# Patient Record
Sex: FEMALE | Race: WHITE
[De-identification: names, ages, dates, MRNs, and addresses within clinical notes are randomized per-mention and may not be internally consistent; named-entity substitution may affect disease eponyms.]

---

## 2017-09-12 ENCOUNTER — HOSPITAL ENCOUNTER (OUTPATIENT)
Dept: HOSPITAL 74 - JASU-ENDO | Age: 42
Discharge: HOME | End: 2017-09-12
Attending: INTERNAL MEDICINE
Payer: COMMERCIAL

## 2017-09-12 VITALS — DIASTOLIC BLOOD PRESSURE: 56 MMHG | HEART RATE: 75 BPM | SYSTOLIC BLOOD PRESSURE: 110 MMHG

## 2017-09-12 VITALS — BODY MASS INDEX: 34.9 KG/M2

## 2017-09-12 VITALS — TEMPERATURE: 97.2 F

## 2017-09-12 DIAGNOSIS — K52.9: ICD-10-CM

## 2017-09-12 DIAGNOSIS — K63.3: ICD-10-CM

## 2017-09-12 DIAGNOSIS — D64.9: Primary | ICD-10-CM

## 2017-09-12 DIAGNOSIS — K64.8: ICD-10-CM

## 2017-09-12 PROCEDURE — 0DJD8ZZ INSPECTION OF LOWER INTESTINAL TRACT, VIA NATURAL OR ARTIFICIAL OPENING ENDOSCOPIC: ICD-10-PCS | Performed by: INTERNAL MEDICINE

## 2017-09-13 NOTE — PATH
Surgical Pathology Report



Patient Name:  SHAHRIAR WORLEY

Accession #:  [id number]

[facility]. Rec. #:  [id number]                                                        

   /Age/Gender:  1975 (Age: 42) / F

Account:  [id number]                                                          

             Location: ASU-ENDOSCOPY

Taken:  2017

Received:  2017

Reported:  2017

Physicians:  Shayne Peterson M.D.

  



Specimen(s) Received

 BX TERMINAL ILEUM 





Clinical History

Family history of colon cancer, anemia

Ulcer terminal ileum, rule out Crohn's disease







Final Diagnosis

TERMINAL ILEUM, ULCER, BIOPSY:

ILEAL MUCOSA SHOWING MODERATE CHRONIC ACTIVE ILEITIS WITH PROMINENT REACTIVE

LYMPHOID AGGREGATES.

NEGATIVE FOR DYSPLASIA.





***Electronically Signed***

Radha Cabrera M.D.





Gross Description

Received in formalin, labeled "biopsy ulcer terminal ileum" is a tan, irregular

portion of soft tissue measuring 0.4 cm. in greatest dimension. The specimen is

submitted in toto in one cassette.





saudi

## 2019-07-12 ENCOUNTER — HOSPITAL ENCOUNTER (INPATIENT)
Dept: HOSPITAL 74 - JER | Age: 44
LOS: 4 days | Discharge: HOME | DRG: 469 | End: 2019-07-16
Attending: INTERNAL MEDICINE | Admitting: INTERNAL MEDICINE
Payer: COMMERCIAL

## 2019-07-12 VITALS — BODY MASS INDEX: 33.5 KG/M2

## 2019-07-12 DIAGNOSIS — Q61.3: ICD-10-CM

## 2019-07-12 DIAGNOSIS — N17.9: Primary | ICD-10-CM

## 2019-07-12 DIAGNOSIS — F17.210: ICD-10-CM

## 2019-07-12 DIAGNOSIS — E78.5: ICD-10-CM

## 2019-07-12 DIAGNOSIS — N18.9: ICD-10-CM

## 2019-07-12 DIAGNOSIS — I13.10: ICD-10-CM

## 2019-07-12 LAB
ALBUMIN SERPL-MCNC: 3.4 G/DL (ref 3.4–5)
ALP SERPL-CCNC: 54 U/L (ref 45–117)
ALT SERPL-CCNC: 23 U/L (ref 13–61)
ANION GAP SERPL CALC-SCNC: 7 MMOL/L (ref 8–16)
APPEARANCE UR: CLEAR
AST SERPL-CCNC: 15 U/L (ref 15–37)
BACTERIA # UR AUTO: 327.9 /HPF
BASOPHILS # BLD: 0.9 % (ref 0–2)
BILIRUB SERPL-MCNC: 0.2 MG/DL (ref 0.2–1)
BILIRUB UR STRIP.AUTO-MCNC: NEGATIVE MG/DL
BUN SERPL-MCNC: 57.6 MG/DL (ref 7–18)
CALCIUM SERPL-MCNC: 8 MG/DL (ref 8.5–10.1)
CASTS URNS QL MICRO: 1 /LPF (ref 0–8)
CHLORIDE SERPL-SCNC: 115 MMOL/L (ref 98–107)
CO2 SERPL-SCNC: 21 MMOL/L (ref 21–32)
COLOR UR: YELLOW
CREAT SERPL-MCNC: 3.9 MG/DL (ref 0.55–1.3)
DEPRECATED RDW RBC AUTO: 13.4 % (ref 11.6–15.6)
EOSINOPHIL # BLD: 0.8 % (ref 0–4.5)
EPITH CASTS URNS QL MICRO: 1.6 /HPF
GLUCOSE SERPL-MCNC: 85 MG/DL (ref 74–106)
HCT VFR BLD CALC: 29.6 % (ref 32.4–45.2)
HGB BLD-MCNC: 10 GM/DL (ref 10.7–15.3)
KETONES UR QL STRIP: NEGATIVE
LEUKOCYTE ESTERASE UR QL STRIP.AUTO: NEGATIVE
LYMPHOCYTES # BLD: 18.5 % (ref 8–40)
MCH RBC QN AUTO: 27.4 PG (ref 25.7–33.7)
MCHC RBC AUTO-ENTMCNC: 33.7 G/DL (ref 32–36)
MCV RBC: 81.3 FL (ref 80–96)
MONOCYTES # BLD AUTO: 5.9 % (ref 3.8–10.2)
NEUTROPHILS # BLD: 73.9 % (ref 42.8–82.8)
NITRITE UR QL STRIP: NEGATIVE
PH UR: 6 [PH] (ref 5–8)
PLATELET # BLD AUTO: 296 K/MM3 (ref 134–434)
PMV BLD: 8.1 FL (ref 7.5–11.1)
POTASSIUM SERPLBLD-SCNC: 4.3 MMOL/L (ref 3.5–5.1)
PROT SERPL-MCNC: 6.9 G/DL (ref 6.4–8.2)
PROT UR QL STRIP: (no result)
PROT UR QL STRIP: NEGATIVE
RBC # BLD AUTO: 3 /HPF (ref 0–4)
RBC # BLD AUTO: 3.64 M/MM3 (ref 3.6–5.2)
SODIUM SERPL-SCNC: 143 MMOL/L (ref 136–145)
SP GR UR: 1.01 (ref 1.01–1.03)
UROBILINOGEN UR STRIP-MCNC: 0.2 MG/DL (ref 0.2–1)
WBC # BLD AUTO: 8.2 K/MM3 (ref 4–10)
WBC # UR AUTO: 3 /HPF (ref 0–5)
YEAST BUDDING URNS QL: (no result)

## 2019-07-12 NOTE — HP
Admitting History and Physical





- Primary Care Physician


PCP: Percy Teresa





- Admission


Chief Complaint: elevated creatinine


History of Present Illness: 





Patient is a 44 year old female with a significant past medical history of 

hypertension, hyperlipidemia, CKD, polycystic kidney disease and daily smoker (2

-5 cigarettes per day).  Patient has been following Dr. Meadows for 

approximately 1 year for worsening for CKD.  Patient sent to [facility] ED from Dr. Teresa office for elevated creatinine on office labs.  Her baseline creatinine is 

reported to be 1.9 and noted to be 3.4 on office labs.  On presentation to the 

ED, creatinine noted to be 3.9.


She denies shortness of breath, denies chest pain, no fatigue, she reports 

making urine, denies any hematuria.  She was recently treated for an URI with 

azithromycin but denies any any NSAIDS.  





Nephrology has been consulted.  Will start patient on gentle hydration pending 

neprhology evaluation.











History Source: Patient


Limitations to Obtaining History: No Limitations





- Past Medical History


Cardiovascular: Yes: HTN





- Smoking History


Smoking history: Current every day smoker


Have you smoked in the past 12 months: Yes


Aproximately how many cigarettes per day: 5





- Alcohol/Substance Use


Hx Alcohol Use: No





Home Medications





- Allergies


Allergies/Adverse Reactions: 


 Allergies











Allergy/AdvReac Type Severity Reaction Status Date / Time


 


No Known Allergies Allergy   Verified 07/12/19 10:47














- Home Medications


Home Medications: 


Ambulatory Orders





Atorvastatin Ca [Lipitor] 10 mg PO DAILY 09/11/17 


Ergocalciferol (Vitamin D2) [Vitamin D2] 50,000 unit PO WEEKLY 09/11/17 


Diltiazem HCl [Diltiazem 24Hr Cd] 240 mg PO DAILY 07/12/19 


Fenofibrate Nanocrystallized [Fenofibrate] 145 mg PO DAILY 07/12/19 


Loratadine [Claritin -] 10 mg PO DAILY PRN 07/12/19 


[place]-3/Dha/Epa/Fish Oil [Fish Oil 1,000 mg Softgel] 2 each PO DAILY 07/12/19 











Review of Systems





- Review of Systems


Constitutional: reports: No Symptoms


Eyes: reports: No Symptoms


HENT: reports: No Symptoms


Neck: reports: No Symptoms


Cardiovascular: reports: No Symptoms


Respiratory: reports: No Symptoms


Gastrointestinal: reports: No Symptoms


Genitourinary: reports: No Symptoms





Physical Examination


Vital Signs: 


 Vital Signs











Temperature  98.6 F   07/12/19 10:44


 


Pulse Rate  92 H  07/12/19 10:44


 


Respiratory Rate  16   07/12/19 10:44


 


Blood Pressure  156/78   07/12/19 10:44


 


O2 Sat by Pulse Oximetry (%)  98   07/12/19 10:44











Constitutional: Yes: Well Nourished, No Distress, Calm


Eyes: Yes: WNL


HENT: Yes: WNL


Neck: Yes: WNL


Cardiovascular: Yes: Regular Rate and Rhythm


Respiratory: Yes: WNL


Gastrointestinal: Yes: WNL


...Rectal Exam: Yes: Deferred


Edema: No


Labs: 


 CBC, BMP





 07/12/19 11:38 





 07/12/19 11:25 











Problem List





- Problems


(1) Renal failure


Assessment/Plan: 


Elevated BUN and creatinine of 3.9 on admission


will start on gentle hydration of NS @ 50cc


Nephrology consulted


further workup per nephrology


Repeat labs in a.m. 


Renal diet


renal dose medications.


Code(s): N19 - UNSPECIFIED KIDNEY FAILURE   





(2) Hypertension


Assessment/Plan: 


BP controlled on Cardizem 240mg daily


Monitor BP q 4 


Code(s): I10 - ESSENTIAL (PRIMARY) HYPERTENSION   





(3) Hyperlipemia


Assessment/Plan: 


On Lipitor and fenofibrate





Code(s): E78.5 - HYPERLIPIDEMIA, UNSPECIFIED   





(4) Prophylactic measure


Assessment/Plan: 


fen


NS @ 50cc/hr


monitor electrolytes


renal diet





full code





Code(s): Z29.9 - ENCOUNTER FOR PROPHYLACTIC MEASURES, UNSPECIFIED   





Visit type





- Emergency Visit


Emergency Visit: Yes


ED Registration Date: 07/12/19


Care time: The patient presented to the Emergency Department on the above date 

and was hospitalized for further evaluation of their emergent condition.





- New Patient


This patient is new to me today: Yes


Date on this admission: 07/12/19





- Critical Care


Critical Care patient: No

## 2019-07-12 NOTE — PDOC
History of Present Illness





- General


Chief Complaint: Revisit, Lab Variance


Stated Complaint: SENT BY PCP


Time Seen by Provider: 07/12/19 11:11


History Source: Patient


Exam Limitations: No Limitations





- History of Present Illness


Initial Comments: 





07/12/19 11:17


45 yo F h/o HTN HLD, CKD from polycystic kidney disease, ( dr strickland 

nephrologist) here today from clinic Dr Teresa, told to come to ED for elevated 

creatinine. baseline creatinine is 1.9, on 7/9 was noted to be 3.45. denies sob

, no chest pain. mild fatigue, no nausea. good appetitie. no change to urine 

output. no hematuria., no dysuria, no frequency. 


recently on azithromycin completed 5 day course 3 days ago for upper 

respiratory infection ( sore throat). 


07/12/19 11:19








Past History





- Past Medical History


Allergies/Adverse Reactions: 


 Allergies











Allergy/AdvReac Type Severity Reaction Status Date / Time


 


No Known Allergies Allergy   Verified 07/12/19 10:47











Home Medications: 


Ambulatory Orders





Atorvastatin Ca [Lipitor] 10 mg PO DAILY 09/11/17 


Ergocalciferol (Vitamin D2) [Vitamin D2] 50,000 unit PO WEEKLY 09/11/17 


Diltiazem HCl [Diltiazem 24Hr Cd] 240 mg PO DAILY 07/12/19 


Fenofibrate Nanocrystallized [Fenofibrate] 145 mg PO DAILY 07/12/19 


Loratadine [Claritin -] 10 mg PO DAILY PRN 07/12/19 


[place]-3/Dha/Epa/Fish Oil [Fish Oil 1,000 mg Softgel] 2 each PO DAILY 07/12/19 








Anemia: Yes


Asthma: No


Cancer: No


Cardiac Disorders: No


COPD: No


 Disorders: Yes (KIDNEY STONES)


HTN: Yes


Hypercholesterolemia: Yes


Kidney Stones: Yes





- Surgical History


Abdominal Surgery: No


Appendectomy: No


Cardiac Surgery: No


Orthopedic Surgery: No





- Family Disease History


Family Disease History: Diabetes: Mother, Heart Disease: Mother





- Immunization History


Immunization Up to Date: Yes





- Suicide/Smoking/Psychosocial Hx


Smoking Status: Yes


Smoking History: Current every day smoker


Have you smoked in the past 12 months: Yes


Number of Cigarettes Smoked Daily: 5


Information on smoking cessation initiated: No


'Breaking Loose' booklet given: 09/12/17


Hx Alcohol Use: No


Drug/Substance Use Hx: No


Substance Use Type: None


Hx Substance Use Treatment: No





**Review of Systems





- Review of Systems


Constitutional: No: Chills, Fever, Weakness


HEENTM: No: Eye Pain


Respiratory: No: Cough, Orthopnea, Shortness of Breath


Cardiac (ROS): No: Chest Pain, Edema, Irregular Heart Rate


: No: Burning, Dysuria, Discharge


Musculoskeletal: No: Back Pain


All Other Systems: Reviewed and Negative





*Physical Exam





- Vital Signs


 Last Vital Signs











Temp Pulse Resp BP Pulse Ox


 


 98.6 F   92 H  16   156/78   98 


 


 07/12/19 10:44  07/12/19 10:44  07/12/19 10:44  07/12/19 10:44  07/12/19 10:44














- Physical Exam


Comments: 





07/12/19 11:20


awake alert lungs clear bilaterally heart rrr no mrg abd soft nt nd ext wwp no 

cva tenderness. skin warm and dry. nuero alert oriented x 3. 





ED Treatment Course





- LABORATORY


CBC & Chemistry Diagram: 


 07/13/19 06:42





 07/13/19 06:42





Medical Decision Making





- Medical Decision Making





07/12/19 11:21


pt 45 yo F h/o HTN HLD, polycystic kidney diseaes, here with elevated 

creatinine. will repeat creatinine, d/w her nephrologist, and pcp dr teresa. ua 

labs ekg cxr ro pulm edema. 


07/12/19 14:02


pt creatinine elevated to 3.9 attempt to contact pt nephrologist dr Strickland, 

awaiting call back. message left for dr teresa, awaiting call back. will admit 

for renal failure. give gentle hydration. 500 NS. consult nephrology. d/w 

admitting team, admitted to dr belcher.


07/12/19 16:00


focused ED ultrasound renal


indication pckd, renal failure


bilat kidneys scanned in two planes.


both demonstrating several cyst c/w known polycystic kidney disease.


right kidney measures 17 cm in length, and left 15 cm.


no noted hydronephrosis however diffiuclty to visualize pelvis due to severity 

of cystic disease.


bladder nondistended.


impression: bilat polycystic kidneys.





pt admitted. will consult nephrology . d/w dr strickland, recommend partner 

[facility]. may require future dialysis.  d/w admitting team. will admit. 





*DC/Admit/Observation/Transfer


Diagnosis at time of Disposition: 


 Renal failure








- Discharge Dispostion


Decision to Admit order: Yes





- Referrals





- Patient Instructions





- Post Discharge Activity

## 2019-07-13 LAB
ANION GAP SERPL CALC-SCNC: 7 MMOL/L (ref 8–16)
BUN SERPL-MCNC: 50.1 MG/DL (ref 7–18)
CALCIUM SERPL-MCNC: 8.1 MG/DL (ref 8.5–10.1)
CHLORIDE SERPL-SCNC: 115 MMOL/L (ref 98–107)
CO2 SERPL-SCNC: 19 MMOL/L (ref 21–32)
CREAT SERPL-MCNC: 3.5 MG/DL (ref 0.55–1.3)
DEPRECATED RDW RBC AUTO: 13.8 % (ref 11.6–15.6)
GLUCOSE SERPL-MCNC: 88 MG/DL (ref 74–106)
HCT VFR BLD CALC: 28.5 % (ref 32.4–45.2)
HGB BLD-MCNC: 9.4 GM/DL (ref 10.7–15.3)
MAGNESIUM SERPL-MCNC: 1.9 MG/DL (ref 1.8–2.4)
MCH RBC QN AUTO: 27.4 PG (ref 25.7–33.7)
MCHC RBC AUTO-ENTMCNC: 33.1 G/DL (ref 32–36)
MCV RBC: 82.8 FL (ref 80–96)
PLATELET # BLD AUTO: 296 K/MM3 (ref 134–434)
PMV BLD: 8.7 FL (ref 7.5–11.1)
POTASSIUM SERPLBLD-SCNC: 4.1 MMOL/L (ref 3.5–5.1)
RBC # BLD AUTO: 3.45 M/MM3 (ref 3.6–5.2)
SODIUM SERPL-SCNC: 141 MMOL/L (ref 136–145)
WBC # BLD AUTO: 7.2 K/MM3 (ref 4–10)

## 2019-07-13 RX ADMIN — ATORVASTATIN CALCIUM SCH MG: 10 TABLET, FILM COATED ORAL at 21:54

## 2019-07-13 RX ADMIN — SODIUM CHLORIDE SCH MLS/HR: 9 INJECTION, SOLUTION INTRAVENOUS at 16:30

## 2019-07-13 RX ADMIN — FENOFIBRIC ACID SCH MG: 135 CAPSULE, DELAYED RELEASE PELLETS ORAL at 09:03

## 2019-07-13 NOTE — EKG
Test Reason : 

Blood Pressure : ***/*** mmHG

Vent. Rate : 092 BPM     Atrial Rate : 092 BPM

   P-R Int : 130 ms          QRS Dur : 084 ms

    QT Int : 368 ms       P-R-T Axes : 027 018 017 degrees

   QTc Int : 455 ms

 

SINUS RHYTHM WITH PREMATURE SUPRAVENTRICULAR COMPLEXES AND FUSION

COMPLEXES

OTHERWISE NORMAL ECG

WHEN COMPARED WITH ECG OF 01-DEC-2016 08:50,

FUSION COMPLEXES ARE NOW PRESENT

PREMATURE SUPRAVENTRICULAR COMPLEXES ARE NOW PRESENT

T WAVE AMPLITUDE HAS INCREASED IN ANTERIOR LEADS

Confirmed by QAMAR MUÑOZ, NAWAF ([id number]) on 7/13/2019 8:14:37 AM

 

Referred By:             Confirmed By:NAWAF FOOTE MD

## 2019-07-13 NOTE — PN
Progress Note, Physician


Chief Complaint: 





denies any malaise or discomfort





History of Present Illness: 





Patient is a 44 year old female with a significant past medical history of 

hypertension, hyperlipidemia, CKD, polycystic kidney disease and daily smoker (2

-5 cigarettes per day).  Patient has been following Dr. Meadows for 

approximately 1 year for worsening for CKD.  Patient sent to [facility] ED from Dr. Teresa office for elevated creatinine on office labs.  Her baseline creatinine is 

reported to be 1.9 and noted to be 3.4 on office labs.  On presentation to the 

ED, creatinine noted to be 3.9.


She denies shortness of breath, denies chest pain, no fatigue, she reports 

making urine, denies any hematuria.  She was recently treated for an URI with 

azithromycin but denies any any NSAIDS.  





Nephrology has been consulted.  Will start patient on gentle hydration pending 

neprhology evaluation.








- Current Medication List


Current Medications: 


Active Medications





Atorvastatin Calcium (Lipitor -)  10 mg PO [facility]


Diltiazem HCl (Cardizem Cd -)  240 mg PO DAILY [facility]


   Last Admin: 07/13/19 09:02 Dose:  240 mg


Fenofibric Acid (Trilipix -)  135 mg PO DAILY [facility]


   Last Admin: 07/13/19 09:03 Dose:  135 mg


Sodium Chloride (Normal Saline -)  1,000 mls @ 50 mls/hr IV ASDIR [facility]


   Stop: 07/13/19 16:24


   Last Admin: 07/12/19 17:26 Dose:  50 mls/hr











- Objective


Vital Signs: 


 Vital Signs











Temperature  98.4 F   07/13/19 10:00


 


Pulse Rate  72   07/13/19 10:00


 


Respiratory Rate  20   07/13/19 10:00


 


Blood Pressure  149/78   07/13/19 10:00


 


O2 Sat by Pulse Oximetry (%)  100   07/12/19 21:00











Constitutional: Yes: Well Nourished


Eyes: Yes: WNL


HENT: Yes: WNL


Neck: Yes: WNL


Cardiovascular: Yes: WNL


Respiratory: Yes: WNL


...Rectal Exam: Yes: WNL


Genitourinary: Yes: WNL


Labs: 


 CBC, BMP





 07/13/19 06:42 





 07/13/19 06:42 











Problem List





- Problems


(1) Renal failure


Assessment/Plan: 


Elevated BUN and creatinine of 3.9 on admission, improving to 3.5 with gentle 

hydration


will continue hydration of NS @ 50cc pending nephrology eval.


further workup per nephrology


Repeat labs in a.m. 


Renal diet


renal dose medications.


Code(s): N19 - UNSPECIFIED KIDNEY FAILURE   





(2) Hypertension


Assessment/Plan: 


BP controlled on Cardizem 240mg daily


Monitor BP q 4 


Code(s): I10 - ESSENTIAL (PRIMARY) HYPERTENSION   





(3) Hyperlipemia


Assessment/Plan: 


On Lipitor and fenofibrate





Code(s): E78.5 - HYPERLIPIDEMIA, UNSPECIFIED   





(4) Prophylactic measure


Assessment/Plan: 


fen


NS @ 50cc/hr


monitor electrolytes


renal diet





full code





Code(s): Z29.9 - ENCOUNTER FOR PROPHYLACTIC MEASURES, UNSPECIFIED   





Visit type





- Emergency Visit


Emergency Visit: Yes


ED Registration Date: 07/12/19


Care time: The patient presented to the Emergency Department on the above date 

and was hospitalized for further evaluation of their emergent condition.





- New Patient


This patient is new to me today: No





- Critical Care


Critical Care patient: No





- Discharge Referral


Referred to [facility] No

## 2019-07-13 NOTE — CON.NEP
Consult





- History of Present Illness


History of Present Illness: 











43 yo F h/o HTN HLD, CKD from polycystic kidney disease, ( dr alexandra 

nephrologist) here today from clinic Dr Teresa, told to come to ED for elevated 

creatinine. baseline creatinine is 1.9, on 7/9 was noted to be 3.45. denies sob

, no chest pain. mild fatigue, no nausea. good appetitie. no change to urine 

output. no hematuria., no dysuria, no frequency. 


recently on azithromycin completed 5 day course 3 days ago for upper 

respiratory infection ( sore throat). 


07/12/19 11:19




















- Past Medical History


Cardio/Vascular: Yes: HTN


...LMP: 06/24/19


...Pregnant: No





- Alcohol/Substance Use


Hx Alcohol Use: No





- Smoking History


Smoking history: Current every day smoker


Have you smoked in the past 12 months: Yes


Aproximately how many cigarettes per day: 5





Home Medications





- Allergies


Allergies/Adverse Reactions: 


 Allergies











Allergy/AdvReac Type Severity Reaction Status Date / Time


 


No Known Allergies Allergy   Verified 07/12/19 10:47














- Home Medications


Home Medications: 


Ambulatory Orders





Atorvastatin Ca [Lipitor] 10 mg PO DAILY 09/11/17 


Ergocalciferol (Vitamin D2) [Vitamin D2] 50,000 unit PO WEEKLY 09/11/17 


Diltiazem HCl [Diltiazem 24Hr Cd] 240 mg PO DAILY 07/12/19 


Fenofibrate Nanocrystallized [Fenofibrate] 145 mg PO DAILY 07/12/19 


Loratadine [Claritin -] 10 mg PO DAILY PRN 07/12/19 


[place]-3/Dha/Epa/Fish Oil [Fish Oil 1,000 mg Softgel] 2 each PO DAILY 07/12/19 











Nephrology Consult





- Height


Height: 5 ft





- Weight


Weight: 172 lb 0.2 oz





- BMI


Body Mass Index (BMI): 33.5





- Lab Results


CBC,BMP: 


 CBC, BMP





 07/13/19 06:42 





 07/13/19 06:42 








Anion Gap: 


 Anion Gap











Anion Gap  7 MMOL/L (8-16)  L  07/13/19  06:42    














- Physical Examination


Vital Signs: 


 Vital Signs











Temperature  98.2 F   07/13/19 14:18


 


Pulse Rate  86   07/13/19 14:18


 


Respiratory Rate  18   07/13/19 14:18


 


Blood Pressure  132/74   07/13/19 14:18


 


O2 Sat by Pulse Oximetry (%)  96   07/13/19 09:00














Assessment/Plan








KOBE on CKD


unclear wht the  kobe is from





h/o sinus problems rxd loratadine and an antibiotic


denies other malaise or symptom





seems to have early good response to IVF at 50 cc


I will increase IVF rate to 100 and f/u labs tomorrow





PKD baseline creat unclear





pt

## 2019-07-14 LAB
ALBUMIN SERPL-MCNC: 2.9 G/DL (ref 3.4–5)
ALP SERPL-CCNC: 41 U/L (ref 45–117)
ALT SERPL-CCNC: 17 U/L (ref 13–61)
ANION GAP SERPL CALC-SCNC: 8 MMOL/L (ref 8–16)
AST SERPL-CCNC: 14 U/L (ref 15–37)
BASOPHILS # BLD: 0.5 % (ref 0–2)
BILIRUB SERPL-MCNC: 0.3 MG/DL (ref 0.2–1)
BUN SERPL-MCNC: 51 MG/DL (ref 7–18)
CALCIUM SERPL-MCNC: 7.9 MG/DL (ref 8.5–10.1)
CHLORIDE SERPL-SCNC: 115 MMOL/L (ref 98–107)
CO2 SERPL-SCNC: 20 MMOL/L (ref 21–32)
CREAT SERPL-MCNC: 3.5 MG/DL (ref 0.55–1.3)
DEPRECATED RDW RBC AUTO: 13.6 % (ref 11.6–15.6)
EOSINOPHIL # BLD: 1 % (ref 0–4.5)
GLUCOSE SERPL-MCNC: 80 MG/DL (ref 74–106)
HCT VFR BLD CALC: 27.2 % (ref 32.4–45.2)
HGB BLD-MCNC: 9 GM/DL (ref 10.7–15.3)
LYMPHOCYTES # BLD: 27.7 % (ref 8–40)
MAGNESIUM SERPL-MCNC: 1.8 MG/DL (ref 1.8–2.4)
MCH RBC QN AUTO: 27.5 PG (ref 25.7–33.7)
MCHC RBC AUTO-ENTMCNC: 33.2 G/DL (ref 32–36)
MCV RBC: 82.8 FL (ref 80–96)
MONOCYTES # BLD AUTO: 5.6 % (ref 3.8–10.2)
NEUTROPHILS # BLD: 65.2 % (ref 42.8–82.8)
PLATELET # BLD AUTO: 278 K/MM3 (ref 134–434)
PMV BLD: 8.5 FL (ref 7.5–11.1)
POTASSIUM SERPLBLD-SCNC: 4.2 MMOL/L (ref 3.5–5.1)
PROT SERPL-MCNC: 6 G/DL (ref 6.4–8.2)
RBC # BLD AUTO: 3.28 M/MM3 (ref 3.6–5.2)
SODIUM SERPL-SCNC: 143 MMOL/L (ref 136–145)
WBC # BLD AUTO: 6.5 K/MM3 (ref 4–10)

## 2019-07-14 RX ADMIN — SODIUM CHLORIDE SCH MLS/HR: 9 INJECTION, SOLUTION INTRAVENOUS at 23:19

## 2019-07-14 RX ADMIN — FENOFIBRIC ACID SCH MG: 135 CAPSULE, DELAYED RELEASE PELLETS ORAL at 09:18

## 2019-07-14 RX ADMIN — SODIUM CHLORIDE SCH MLS/HR: 9 INJECTION, SOLUTION INTRAVENOUS at 02:35

## 2019-07-14 RX ADMIN — SODIUM CHLORIDE SCH MLS/HR: 9 INJECTION, SOLUTION INTRAVENOUS at 16:29

## 2019-07-14 RX ADMIN — SODIUM CHLORIDE SCH MLS/HR: 9 INJECTION, SOLUTION INTRAVENOUS at 22:50

## 2019-07-14 RX ADMIN — ATORVASTATIN CALCIUM SCH MG: 10 TABLET, FILM COATED ORAL at 21:42

## 2019-07-14 NOTE — PN
Progress Note, Physician


Chief Complaint: 





denies any malaise or discomfort





History of Present Illness: 





Patient is a 44 year old female with a significant past medical history of 

hypertension, hyperlipidemia, CKD, polycystic kidney disease and daily smoker (2

-5 cigarettes per day).  Patient has been following Dr. Meadows for 

approximately 1 year for worsening for CKD.  Patient sent to [facility] ED from Dr. Teresa office for elevated creatinine on office labs.  Her baseline creatinine is 

reported to be 1.9 and noted to be 3.4 on office labs.  On presentation to the 

ED, creatinine noted to be 3.9.


She denies shortness of breath, denies chest pain, no fatigue, she reports 

making urine, denies any hematuria.  She was recently treated for an URI with 

azithromycin but denies any any NSAIDS.  





focused ED ultrasound renal per ED admission note:


indication pckd, renal failure


bilat kidneys scanned in two planes.


both demonstrating several cyst c/w known polycystic kidney disease.


right kidney measures 17 cm in length, and left 15 cm.


no noted hydronephrosis however diffiuclty to visualize pelvis due to severity 

of cystic disease.


bladder nondistended.


impression: bilat polycystic kidneys.





- Current Medication List


Current Medications: 


Active Medications





Atorvastatin Calcium (Lipitor -)  10 mg PO HS [facility]


   Last Admin: 07/13/19 21:54 Dose:  10 mg


Diltiazem HCl (Cardizem Cd -)  240 mg PO DAILY [facility]


   Last Admin: 07/14/19 09:18 Dose:  240 mg


Fenofibric Acid (Trilipix -)  135 mg PO DAILY [facility]


   Last Admin: 07/14/19 09:18 Dose:  135 mg


Sodium Chloride (Normal Saline -)  1,000 mls @ 100 mls/hr IV ASDIR [facility]


   Stop: 07/18/19 16:24


   Last Admin: 07/14/19 02:35 Dose:  100 mls/hr











- Objective


Vital Signs: 


 Vital Signs











Temperature  98.7 F   07/14/19 06:00


 


Pulse Rate  74   07/14/19 06:00


 


Respiratory Rate  18   07/14/19 06:00


 


Blood Pressure  141/73   07/14/19 06:00


 


O2 Sat by Pulse Oximetry (%)  98   07/13/19 21:00











Constitutional: Yes: Well Nourished, No Distress


Eyes: Yes: WNL


HENT: Yes: WNL, Atraumatic


Neck: Yes: WNL


Cardiovascular: Yes: WNL


Respiratory: Yes: Regular


Gastrointestinal: Yes: Normal Bowel Sounds


...Rectal Exam: Yes: Deferred


Musculoskeletal: Yes: WNL


Extremities: Yes: WNL


Edema: No


Peripheral Pulses WNL: Yes


Integumentary: Yes: WNL


Neurological: Yes: WNL, Alert, Oriented


Labs: 


 CBC, BMP





 07/14/19 07:10 





 07/14/19 07:10 











Problem List





- Problems


(1) Renal failure


Assessment/Plan: 


Elevated BUN and creatinine of 3.9 on admission, currently 3.5 with NS @ 100


further workup per nephrology


Renal diet


renal dose medications.


Code(s): N19 - UNSPECIFIED KIDNEY FAILURE   





(2) Hypertension


Assessment/Plan: 


BP controlled on Cardizem 240mg daily


Monitor BP q 4 


Code(s): I10 - ESSENTIAL (PRIMARY) HYPERTENSION   





(3) Hyperlipemia


Assessment/Plan: 


On Lipitor and fenofibrate





Code(s): E78.5 - HYPERLIPIDEMIA, UNSPECIFIED   





(4) Prophylactic measure


Assessment/Plan: 


fen


NS @ 100cc/hr


monitor electrolytes


renal diet





full code





Code(s): Z29.9 - ENCOUNTER FOR PROPHYLACTIC MEASURES, UNSPECIFIED   





Visit type





- Emergency Visit


Emergency Visit: Yes


ED Registration Date: 07/12/19


Care time: The patient presented to the Emergency Department on the above date 

and was hospitalized for further evaluation of their emergent condition.





- New Patient


This patient is new to me today: No





- Critical Care


Critical Care patient: No





- Discharge Referral


Referred to [facility] No

## 2019-07-14 NOTE — PN
Progress Note (short form)





- Note


Progress Note: 








KOBE on CKD


unclear wht the  kobe is from


s/p  sinus problems - rxd loratadine and an antibiotic


denies other malaise or symptom





seems to have early good response to IVF at 50 cc


I will increase IVF rate to 100 and f/u labs tomorrow





PKD baseline creat unclear





 Current Medications





Atorvastatin Calcium (Lipitor -)  10 mg PO HS [facility]


   Last Admin: 07/13/19 21:54 Dose:  10 mg


Diltiazem HCl (Cardizem Cd -)  240 mg PO DAILY [facility]


   Last Admin: 07/14/19 09:18 Dose:  240 mg


Fenofibric Acid (Trilipix -)  135 mg PO DAILY [facility]


   Last Admin: 07/14/19 09:18 Dose:  135 mg


Sodium Chloride (Normal Saline -)  1,000 mls @ 100 mls/hr IV ASDIR [facility]


   Stop: 07/18/19 16:24


   Last Admin: 07/14/19 16:29 Dose:  100 mls/hr





 Last Vital Signs











Temp Pulse Resp BP Pulse Ox


 


 98.8 F   96 H  18   138/72   98 


 


 07/14/19 18:00  07/14/19 18:00  07/14/19 18:00  07/14/19 18:00  07/14/19 09:00








alert in nad


feeling better today


Lungs clear


heart reg


Abd soft nontender


ext no edema





 CBC, BMP





 07/14/19 07:10 





 07/14/19 07:10 





IMP- KOBE on chronic


on IVF hydration











Plan- f/u labs tomorrow


continue IVF


strict I and O

## 2019-07-15 LAB
ALBUMIN SERPL-MCNC: 3 G/DL (ref 3.4–5)
ALP SERPL-CCNC: 37 U/L (ref 45–117)
ALT SERPL-CCNC: 17 U/L (ref 13–61)
ANION GAP SERPL CALC-SCNC: 5 MMOL/L (ref 8–16)
AST SERPL-CCNC: 10 U/L (ref 15–37)
BASOPHILS # BLD: 0.5 % (ref 0–2)
BILIRUB SERPL-MCNC: 0.2 MG/DL (ref 0.2–1)
BUN SERPL-MCNC: 47 MG/DL (ref 7–18)
CALCIUM SERPL-MCNC: 8.1 MG/DL (ref 8.5–10.1)
CHLORIDE SERPL-SCNC: 117 MMOL/L (ref 98–107)
CO2 SERPL-SCNC: 20 MMOL/L (ref 21–32)
CREAT SERPL-MCNC: 3.4 MG/DL (ref 0.55–1.3)
DEPRECATED RDW RBC AUTO: 13.5 % (ref 11.6–15.6)
EOSINOPHIL # BLD: 0.8 % (ref 0–4.5)
GLUCOSE SERPL-MCNC: 82 MG/DL (ref 74–106)
HCT VFR BLD CALC: 26.7 % (ref 32.4–45.2)
HGB BLD-MCNC: 8.9 GM/DL (ref 10.7–15.3)
LYMPHOCYTES # BLD: 22.9 % (ref 8–40)
MCH RBC QN AUTO: 27.7 PG (ref 25.7–33.7)
MCHC RBC AUTO-ENTMCNC: 33.4 G/DL (ref 32–36)
MCV RBC: 82.8 FL (ref 80–96)
MONOCYTES # BLD AUTO: 6.9 % (ref 3.8–10.2)
NEUTROPHILS # BLD: 68.9 % (ref 42.8–82.8)
PLATELET # BLD AUTO: 263 K/MM3 (ref 134–434)
PMV BLD: 8.4 FL (ref 7.5–11.1)
POTASSIUM SERPLBLD-SCNC: 4.1 MMOL/L (ref 3.5–5.1)
PROT SERPL-MCNC: 5.8 G/DL (ref 6.4–8.2)
RBC # BLD AUTO: 3.23 M/MM3 (ref 3.6–5.2)
SERUM IRON SATURATION: 18 % (ref 15–55)
SODIUM SERPL-SCNC: 142 MMOL/L (ref 136–145)
TIBC SERPL-MCNC: 387 UG/DL (ref 250–450)
WBC # BLD AUTO: 6.7 K/MM3 (ref 4–10)

## 2019-07-15 RX ADMIN — SODIUM CHLORIDE SCH: 9 INJECTION, SOLUTION INTRAVENOUS at 14:57

## 2019-07-15 RX ADMIN — SODIUM CHLORIDE SCH MLS/HR: 9 INJECTION, SOLUTION INTRAVENOUS at 09:54

## 2019-07-15 RX ADMIN — SODIUM CHLORIDE SCH MLS/HR: 9 INJECTION, SOLUTION INTRAVENOUS at 15:01

## 2019-07-15 RX ADMIN — ATORVASTATIN CALCIUM SCH MG: 10 TABLET, FILM COATED ORAL at 22:04

## 2019-07-15 RX ADMIN — FENOFIBRIC ACID SCH MG: 135 CAPSULE, DELAYED RELEASE PELLETS ORAL at 10:38

## 2019-07-15 NOTE — PN
Progress Note, Physician


Chief Complaint: 





having mild headaches this morning, otherwise feels well.





History of Present Illness: 





Patient is a 44 year old female with a significant past medical history of 

hypertension, hyperlipidemia, CKD, polycystic kidney disease and daily smoker (2

-5 cigarettes per day).  Patient has been following Dr. Meadows for 

approximately 1 year for worsening for CKD.  Patient sent to [facility] ED from Dr. Teresa office for elevated creatinine on office labs.  Her baseline creatinine is 

reported to be 1.9 and noted to be 3.4 on office labs.  On presentation to the 

ED, creatinine noted to be 3.9.


She denies shortness of breath, denies chest pain, no fatigue, she reports 

making urine, denies any hematuria.  She was recently treated for an URI with 

azithromycin but denies any any NSAIDS.  





ED ultrasound renal per ED admission note:


indication pckd, renal failure


bilat kidneys scanned in two planes.


both demonstrating several cyst c/w known polycystic kidney disease.


right kidney measures 17 cm in length, and left 15 cm.


no noted hydronephrosis however diffiuclty to visualize pelvis due to severity 

of cystic disease.


bladder nondistended.


impression: bilat polycystic kidneys.





Currently, patient is being managed with IVF of NS @ 100cc/hr.  She is being 

followed by nephrology.  Further plans and workup per nephrology. 





- Current Medication List


Current Medications: 


Active Medications





Acetaminophen (Tylenol -)  650 mg PO Q6H PRN


   PRN Reason: HEADACHE


   Last Admin: 07/15/19 13:04 Dose:  650 mg


Atorvastatin Calcium (Lipitor -)  10 mg PO HS LALIT


   Last Admin: 07/14/19 21:42 Dose:  10 mg


Diltiazem HCl (Cardizem Cd -)  240 mg PO DAILY LALIT


   Last Admin: 07/15/19 10:38 Dose:  240 mg


Fenofibric Acid (Trilipix -)  135 mg PO DAILY LALIT


   Last Admin: 07/15/19 10:38 Dose:  135 mg


Sodium Chloride (Normal Saline -)  1,000 mls @ 100 mls/hr IV ASDIR LALIT


   Stop: 07/18/19 16:24


   Last Admin: 07/15/19 09:54 Dose:  100 mls/hr











- Objective


Vital Signs: 


 Vital Signs











Temperature  98.7 F   07/15/19 08:54


 


Pulse Rate  72   07/15/19 08:54


 


Respiratory Rate  20   07/15/19 09:00


 


Blood Pressure  134/68   07/15/19 08:54


 


O2 Sat by Pulse Oximetry (%)  97   07/15/19 09:00











Constitutional: Yes: Well Nourished, No Distress


Eyes: Yes: WNL


HENT: Yes: WNL


Neck: Yes: WNL, Supple


Cardiovascular: Yes: Regular Rate and Rhythm


Respiratory: Yes: Regular, CTA Bilaterally


Gastrointestinal: Yes: Normal Bowel Sounds, Soft


...Rectal Exam: Yes: Deferred


Genitourinary: Yes: WNL


Edema: No


Peripheral Pulses WNL: No


Integumentary: Yes: WNL


Labs: 


 CBC, BMP





 07/15/19 07:51 





 07/15/19 07:51 











Problem List





- Problems


(1) Renal failure


Assessment/Plan: 


Elevated BUN and creatinine of 3.9 on admission, currently 3.4 with NS @ 100


further workup per nephrology


Renal diet


renal dose medications.


Code(s): N19 - UNSPECIFIED KIDNEY FAILURE   





(2) Hypertension


Assessment/Plan: 


BP controlled on Cardizem 240mg daily


Monitor BP q 4 


Code(s): I10 - ESSENTIAL (PRIMARY) HYPERTENSION   





(3) Hyperlipemia


Assessment/Plan: 


On Lipitor and fenofibrate





Code(s): E78.5 - HYPERLIPIDEMIA, UNSPECIFIED   





(4) Tobacco abuse


Assessment/Plan: 


smokes 2-5 cigarettes on most days


cessation discussed as it can be harmful to her kidneys


she is refusing nicotine patch, willing to quit


Code(s): Z72.0 - TOBACCO USE   





(5) Prophylactic measure


Assessment/Plan: 


fen


NS @ 100cc/hr


monitor electrolytes


renal diet





full code





Code(s): Z29.9 - ENCOUNTER FOR PROPHYLACTIC MEASURES, UNSPECIFIED   





Visit type





- Emergency Visit


Emergency Visit: Yes


ED Registration Date: 07/12/19


Care time: The patient presented to the Emergency Department on the above date 

and was hospitalized for further evaluation of their emergent condition.





- New Patient


This patient is new to me today: No





- Critical Care


Critical Care patient: No





- Discharge Referral


Referred to [facility] No

## 2019-07-15 NOTE — PN
Progress Note, Physician


History of Present Illness: 





Pt seen and examined at bedside. She is awake and alert. She denies shortness 

of breath. 





- Current Medication List


Current Medications: 


Active Medications





Acetaminophen (Tylenol -)  650 mg PO Q6H PRN


   PRN Reason: HEADACHE


   Last Admin: 07/15/19 13:04 Dose:  650 mg


Atorvastatin Calcium (Lipitor -)  10 mg PO HS LALIT


   Last Admin: 07/14/19 21:42 Dose:  10 mg


Diltiazem HCl (Cardizem Cd -)  240 mg PO DAILY LALIT


   Last Admin: 07/15/19 10:38 Dose:  240 mg


Fenofibric Acid (Trilipix -)  135 mg PO DAILY LALIT


   Last Admin: 07/15/19 10:38 Dose:  135 mg


Sodium Chloride (Normal Saline -)  1,000 mls @ 100 mls/hr IV ASDIR LALIT


   Stop: 07/18/19 16:24


   Last Admin: 07/15/19 15:01 Dose:  100 mls/hr











- Objective


Vital Signs: 


 Vital Signs











Temperature  98.3 F   07/15/19 14:00


 


Pulse Rate  79   07/15/19 14:00


 


Respiratory Rate  20   07/15/19 14:00


 


Blood Pressure  140/73   07/15/19 14:00


 


O2 Sat by Pulse Oximetry (%)  97   07/15/19 09:00











Constitutional: Yes: Calm


Eyes: Yes: Conjunctiva Clear


HENT: Yes: Atraumatic


Neck: Yes: Supple


Cardiovascular: Yes: S1, S2


Respiratory: Yes: CTA Bilaterally


Gastrointestinal: Yes: Soft


Genitourinary: Yes: WNL


Musculoskeletal: Yes: WNL


Extremities: Yes: WNL


Edema: No


Neurological: Yes: Oriented


Psychiatric: Yes: Oriented


Labs: 


 CBC, BMP





 07/15/19 07:51 





 07/15/19 07:51 











Assessment/Plan





 Current Medications











Generic Name Dose Route Start Last Admin





  Trade Name Freq  PRN Reason Stop Dose Admin


 


Acetaminophen  650 mg  07/15/19 12:31  07/15/19 13:04





  Tylenol -  PO   650 mg





  Q6H PRN   Administration





  HEADACHE   





     





     





     


 


Atorvastatin Calcium  10 mg  07/13/19 22:00  07/14/19 21:42





  Lipitor -  PO   10 mg





  HS LALIT   Administration





     





     





     





     


 


Diltiazem HCl  240 mg  07/13/19 10:00  07/15/19 10:38





  Cardizem Cd -  PO   240 mg





  DAILY LALIT   Administration





     





     





     





     


 


Fenofibric Acid  135 mg  07/13/19 10:00  07/15/19 10:38





  Trilipix -  PO   135 mg





  DAILY LALIT   Administration





     





     





     





     


 


Sodium Chloride  1,000 mls @ 100 mls/hr  07/13/19 16:14  07/15/19 15:01





  Normal Saline -  IV  07/18/19 16:24  100 mls/hr





  ASDIR LALIT   Administration





     





     





     





     








Impression


1. CKD


2. PKD


3. HTN


4. HLD





Plan


- renal function slowly improving


- check renal ultrasound


- can be progression of ckd


- will need outpt follow up with Dr Adames

## 2019-07-16 VITALS — HEART RATE: 76 BPM | DIASTOLIC BLOOD PRESSURE: 73 MMHG | SYSTOLIC BLOOD PRESSURE: 146 MMHG

## 2019-07-16 VITALS — TEMPERATURE: 98.9 F

## 2019-07-16 RX ADMIN — FENOFIBRIC ACID SCH MG: 135 CAPSULE, DELAYED RELEASE PELLETS ORAL at 10:20

## 2019-07-16 NOTE — PN
Progress Note, Physician


History of Present Illness: 





Pt seen and examined at bedside. She is awake and alert. She denies dysuria. 

She denies hematuria. 





- Current Medication List


Current Medications: 


Active Medications





Acetaminophen (Tylenol -)  650 mg PO Q6H PRN


   PRN Reason: HEADACHE


Atorvastatin Calcium (Lipitor -)  10 mg PO HS [facility]


   Last Admin: 07/15/19 22:04 Dose:  10 mg


Diltiazem HCl (Cardizem Cd -)  240 mg PO DAILY [facility]


   Last Admin: 07/16/19 10:21 Dose:  240 mg


Fenofibric Acid (Trilipix -)  135 mg PO DAILY [facility]


   Last Admin: 07/16/19 10:20 Dose:  135 mg


Sodium Chloride (Normal Saline -)  1,000 mls @ 100 mls/hr IV ASDIR LALIT


   Stop: 07/18/19 16:24


   Last Admin: 07/15/19 15:01 Dose:  100 mls/hr











- Objective


Vital Signs: 


 Vital Signs











Temperature  98.9 F   07/16/19 06:00


 


Pulse Rate  70   07/16/19 06:00


 


Respiratory Rate  18   07/16/19 06:00


 


Blood Pressure  127/68   07/16/19 06:00


 


O2 Sat by Pulse Oximetry (%)  98   07/15/19 21:00











Constitutional: Yes: Calm


Eyes: Yes: Conjunctiva Clear


HENT: Yes: Atraumatic


Neck: Yes: Supple


Cardiovascular: Yes: S1, S2


Respiratory: Yes: CTA Bilaterally


Gastrointestinal: Yes: Soft


Genitourinary: Yes: WNL


Musculoskeletal: Yes: WNL


Edema: No


Integumentary: Yes: WNL


Neurological: Yes: Oriented


Psychiatric: Yes: Oriented


Labs: 


 CBC, BMP





 07/15/19 07:51 





 07/15/19 07:51 











Assessment/Plan


 Current Medications











Generic Name Dose Route Start Last Admin





  Trade Name Freq  PRN Reason Stop Dose Admin


 


Acetaminophen  650 mg  07/15/19 12:31  





  Tylenol -  PO   





  Q6H PRN   





  HEADACHE   





     





     





     


 


Atorvastatin Calcium  10 mg  07/13/19 22:00  07/15/19 22:04





  Lipitor -  PO   10 mg





  HS LALIT   Administration





     





     





     





     


 


Diltiazem HCl  240 mg  07/13/19 10:00  07/16/19 10:21





  Cardizem Cd -  PO   240 mg





  DAILY LALIT   Administration





     





     





     





     


 


Fenofibric Acid  135 mg  07/13/19 10:00  07/16/19 10:20





  Trilipix -  PO   135 mg





  DAILY LALIT   Administration





     





     





     





     


 


Sodium Chloride  1,000 mls @ 100 mls/hr  07/13/19 16:14  07/15/19 15:01





  Normal Saline -  IV  07/18/19 16:24  100 mls/hr





  ASDIR LALIT   Administration





     





     





     





     











Impression


1. CKD


2. PKD


3. HTN


4. HLD





Plan


- renal function continues to improve


- will need outpt follow up


- pt has PKD, possible worsening of renal function is progression of her disease


- discussed importance of follow up, she has missed several appointments

## 2019-07-16 NOTE — DS
Physical Examination


Vital Signs: 


 Vital Signs











Temperature  98.9 F   07/16/19 06:00


 


Pulse Rate  70   07/16/19 06:00


 


Respiratory Rate  18   07/16/19 06:00


 


Blood Pressure  127/68   07/16/19 06:00


 


O2 Sat by Pulse Oximetry (%)  98   07/15/19 21:00











Findings/Remarks: 





Doing well, eating breakfast no complaints, no abdominal pain, cp, sob, 

palpitations, LE edema


Constitutional: Yes: Well Nourished, No Distress, Calm


Cardiovascular: Yes: WNL, Regular Rate and Rhythm


Respiratory: Yes: WNL, Regular, CTA Bilaterally


Gastrointestinal: Yes: WNL, Normal Bowel Sounds, Soft


Musculoskeletal: Yes: WNL


Edema: No


...Motor Strength: WNL


Labs: 


 CBC, BMP





 07/15/19 07:51 





 07/15/19 07:51 











Discharge Summary


Reason For Visit: RENAL FAILURE


Current Active Problems





Hyperlipemia (Acute)


Hypertension (Acute)


Renal failure (Acute)


Tobacco abuse (Acute)








Hospital Course: 








Patient is a 44 year old female with a PMHx of hypertension, hyperlipidemia, CKD

, polycystic kidney disease and daily smoker (2-5 cigarettes per day).  Patient 

has been following Dr. Adames for approximately 1 year for worsening for CKD.  

Patient sent to [facility] ED from Dr. Teresa office for elevated creatinine on office 

labs.  Her baseline creatinine is reported to be 1.9 and noted to be 3.4 on 

office labs.  On presentation to the ED, creatinine noted to be 3.9.


She denies shortness of breath, denies chest pain, no fatigue, she reports 

making urine, denies any hematuria.  She was recently treated for an URI with 

azithromycin but denies any any NSAIDS.  





ED ultrasound renal per ED admission note:


indication pckd, renal failure


bilateral kidneys scanned in two planes.


both demonstrating several cyst c/w known polycystic kidney disease.


right kidney measures 17 cm in length, and left 15 cm.


no noted hydronephrosis however difficulty to visualize pelvis due to severity 

of cystic disease.


bladder nondistended.


impression: bilateral polycystic kidneys.





Patient was admitted to hospital:





(1) Renal failure





Elevated BUN and creatinine 


renal us with bilateral polycystic disease


no acute pathology, outpatient MRI advised for further evaluation


slightly improved creat with hydration


further workup per nephrology OUTPATIENT


renal diet


renal dose medications





(2) Hypertension





BP controlled on Cardizem





(3) Hyperlipemia





On Lipitor and fenofibrate








(4) Tobacco abuse





smokes 2-5 cigarettes on most days


cessation discussed as it can be harmful to her kidneys


she is refusing nicotine patch, willing to quit





Condition: Good





- Instructions


Referrals: 


Sha Adames MD [Staff Physician] - 


Percy Teresa MD [Non Staff, Medical] - 


Disposition: HOME





- Home Medications


Comprehensive Discharge Medication List: 


Ambulatory Orders





Atorvastatin Ca [Lipitor] 10 mg PO DAILY 09/11/17 


Ergocalciferol (Vitamin D2) [Vitamin D2] 50,000 unit PO WEEKLY 09/11/17 


Diltiazem HCl [Diltiazem 24Hr Cd] 240 mg PO DAILY 07/12/19 


Fenofibrate Nanocrystallized [Fenofibrate] 145 mg PO DAILY 07/12/19 


Loratadine [Claritin -] 10 mg PO DAILY PRN 07/12/19 


Foley-3/Dha/Epa/Fish Oil [Fish Oil 1,000 mg Softgel] 2 each PO DAILY 07/12/19

## 2022-03-11 ENCOUNTER — HOSPITAL ENCOUNTER (INPATIENT)
Dept: HOSPITAL 74 - JER | Age: 47
LOS: 1 days | Discharge: HOME | DRG: 314 | End: 2022-03-12
Attending: INTERNAL MEDICINE | Admitting: STUDENT IN AN ORGANIZED HEALTH CARE EDUCATION/TRAINING PROGRAM
Payer: COMMERCIAL

## 2022-03-11 ENCOUNTER — HOSPITAL ENCOUNTER (OUTPATIENT)
Dept: HOSPITAL 74 - JASU-SURG | Age: 47
Discharge: HOME | End: 2022-03-11
Attending: SURGERY
Payer: MEDICARE

## 2022-03-11 VITALS — BODY MASS INDEX: 33.5 KG/M2

## 2022-03-11 VITALS — BODY MASS INDEX: 35.2 KG/M2

## 2022-03-11 DIAGNOSIS — T82.868A: Primary | ICD-10-CM

## 2022-03-11 DIAGNOSIS — N18.6: ICD-10-CM

## 2022-03-11 DIAGNOSIS — Q61.3: ICD-10-CM
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
## 2022-03-11 DIAGNOSIS — R94.31: ICD-10-CM

## 2022-03-11 DIAGNOSIS — E78.5: ICD-10-CM

## 2022-03-11 DIAGNOSIS — Z99.2: ICD-10-CM

## 2022-03-11 DIAGNOSIS — I12.0: ICD-10-CM

## 2022-03-11 DIAGNOSIS — Z53.8: Primary | ICD-10-CM

## 2022-03-11 LAB
ALBUMIN SERPL-MCNC: 3.3 G/DL (ref 3.4–5)
ALP SERPL-CCNC: 79 U/L (ref 45–117)
ALT SERPL-CCNC: 17 U/L (ref 13–61)
ANION GAP SERPL CALC-SCNC: 14 MMOL/L (ref 8–16)
APTT BLD: 28.4 SECONDS (ref 25.2–36.5)
AST SERPL-CCNC: 17 U/L (ref 15–37)
BASOPHILS # BLD: 1 % (ref 0–2)
BILIRUB SERPL-MCNC: 0.3 MG/DL (ref 0.2–1)
BUN SERPL-MCNC: 96.6 MG/DL (ref 7–18)
CALCIUM SERPL-MCNC: 7.7 MG/DL (ref 8.5–10.1)
CHLORIDE SERPL-SCNC: 106 MMOL/L (ref 98–107)
CO2 SERPL-SCNC: 19 MMOL/L (ref 21–32)
CREAT SERPL-MCNC: 10.6 MG/DL (ref 0.55–1.3)
DEPRECATED RDW RBC AUTO: 14.9 % (ref 11.6–15.6)
EOSINOPHIL # BLD: 0.7 % (ref 0–4.5)
GLUCOSE SERPL-MCNC: 94 MG/DL (ref 74–106)
HCT VFR BLD CALC: 23.8 % (ref 32.4–45.2)
HGB BLD-MCNC: 8.1 GM/DL (ref 10.7–15.3)
INR BLD: 0.95 (ref 0.83–1.09)
LYMPHOCYTES # BLD: 13.3 % (ref 8–40)
MCH RBC QN AUTO: 32.4 PG (ref 25.7–33.7)
MCHC RBC AUTO-ENTMCNC: 33.9 G/DL (ref 32–36)
MCV RBC: 95.7 FL (ref 80–96)
MONOCYTES # BLD AUTO: 4.3 % (ref 3.8–10.2)
NEUTROPHILS # BLD: 80.7 % (ref 42.8–82.8)
PLATELET # BLD AUTO: 199 10^3/UL (ref 134–434)
PMV BLD: 7.5 FL (ref 7.5–11.1)
PROT SERPL-MCNC: 6.6 G/DL (ref 6.4–8.2)
PT PNL PPP: 10.9 SEC (ref 9.7–13)
RBC # BLD AUTO: 2.49 M/MM3 (ref 3.6–5.2)
SODIUM SERPL-SCNC: 139 MMOL/L (ref 136–145)
WBC # BLD AUTO: 7 K/MM3 (ref 4–10)

## 2022-03-11 PROCEDURE — B518ZZA FLUOROSCOPY OF SUPERIOR VENA CAVA, GUIDANCE: ICD-10-PCS | Performed by: SURGERY

## 2022-03-11 PROCEDURE — 02HV33Z INSERTION OF INFUSION DEVICE INTO SUPERIOR VENA CAVA, PERCUTANEOUS APPROACH: ICD-10-PCS | Performed by: SURGERY

## 2022-03-11 PROCEDURE — U0003 INFECTIOUS AGENT DETECTION BY NUCLEIC ACID (DNA OR RNA); SEVERE ACUTE RESPIRATORY SYNDROME CORONAVIRUS 2 (SARS-COV-2) (CORONAVIRUS DISEASE [COVID-19]), AMPLIFIED PROBE TECHNIQUE, MAKING USE OF HIGH THROUGHPUT TECHNOLOGIES AS DESCRIBED BY CMS-2020-01-R: HCPCS

## 2022-03-11 PROCEDURE — U0005 INFEC AGEN DETEC AMPLI PROBE: HCPCS

## 2022-03-11 RX ADMIN — CALCIUM ACETATE SCH MG: 667 CAPSULE ORAL at 19:03

## 2022-03-12 VITALS — TEMPERATURE: 98.3 F

## 2022-03-12 VITALS — DIASTOLIC BLOOD PRESSURE: 88 MMHG | HEART RATE: 113 BPM | SYSTOLIC BLOOD PRESSURE: 161 MMHG

## 2022-03-12 LAB
ALBUMIN SERPL-MCNC: 3.1 G/DL (ref 3.4–5)
ALP SERPL-CCNC: 65 U/L (ref 45–117)
ALT SERPL-CCNC: 15 U/L (ref 13–61)
ANION GAP SERPL CALC-SCNC: 13 MMOL/L (ref 8–16)
AST SERPL-CCNC: 13 U/L (ref 15–37)
BASOPHILS # BLD: 0.4 % (ref 0–2)
BILIRUB SERPL-MCNC: 0.3 MG/DL (ref 0.2–1)
BUN SERPL-MCNC: 92.9 MG/DL (ref 7–18)
CALCIUM SERPL-MCNC: 7.4 MG/DL (ref 8.5–10.1)
CHLORIDE SERPL-SCNC: 110 MMOL/L (ref 98–107)
CO2 SERPL-SCNC: 17 MMOL/L (ref 21–32)
CREAT SERPL-MCNC: 10.7 MG/DL (ref 0.55–1.3)
DEPRECATED RDW RBC AUTO: 14.7 % (ref 11.6–15.6)
EOSINOPHIL # BLD: 0.2 % (ref 0–4.5)
GLUCOSE SERPL-MCNC: 99 MG/DL (ref 74–106)
HCT VFR BLD CALC: 23 % (ref 32.4–45.2)
HGB BLD-MCNC: 8 GM/DL (ref 10.7–15.3)
LYMPHOCYTES # BLD: 8.5 % (ref 8–40)
MAGNESIUM SERPL-MCNC: 2.5 MG/DL (ref 1.8–2.4)
MCH RBC QN AUTO: 32.6 PG (ref 25.7–33.7)
MCHC RBC AUTO-ENTMCNC: 34.6 G/DL (ref 32–36)
MCV RBC: 94.3 FL (ref 80–96)
MONOCYTES # BLD AUTO: 3.7 % (ref 3.8–10.2)
NEUTROPHILS # BLD: 87.2 % (ref 42.8–82.8)
PHOSPHATE SERPL-MCNC: 8.1 MG/DL (ref 2.5–4.9)
PLATELET # BLD AUTO: 188 10^3/UL (ref 134–434)
PMV BLD: 7.5 FL (ref 7.5–11.1)
PROT SERPL-MCNC: 6.2 G/DL (ref 6.4–8.2)
RBC # BLD AUTO: 2.44 M/MM3 (ref 3.6–5.2)
SODIUM SERPL-SCNC: 141 MMOL/L (ref 136–145)
WBC # BLD AUTO: 7.8 K/MM3 (ref 4–10)

## 2022-03-12 RX ADMIN — CALCIUM ACETATE SCH MG: 667 CAPSULE ORAL at 08:16

## 2022-03-12 RX ADMIN — CALCIUM ACETATE SCH MG: 667 CAPSULE ORAL at 19:40

## 2022-03-12 RX ADMIN — CALCIUM ACETATE SCH MG: 667 CAPSULE ORAL at 12:18

## 2022-04-01 ENCOUNTER — HOSPITAL ENCOUNTER (OUTPATIENT)
Dept: HOSPITAL 74 - JASU-SURG | Age: 47
Discharge: HOME | End: 2022-04-01
Attending: SURGERY
Payer: MEDICARE

## 2022-04-01 VITALS — SYSTOLIC BLOOD PRESSURE: 132 MMHG | HEART RATE: 92 BPM | TEMPERATURE: 98 F | DIASTOLIC BLOOD PRESSURE: 72 MMHG

## 2022-04-01 VITALS — BODY MASS INDEX: 33 KG/M2

## 2022-04-01 DIAGNOSIS — N18.6: ICD-10-CM

## 2022-04-01 DIAGNOSIS — T82.898A: Primary | ICD-10-CM

## 2022-04-01 DIAGNOSIS — I12.0: ICD-10-CM

## 2022-04-01 DIAGNOSIS — Z99.2: ICD-10-CM

## 2022-04-01 PROCEDURE — 05WY03Z REVISION OF INFUSION DEVICE IN UPPER VEIN, OPEN APPROACH: ICD-10-PCS | Performed by: SURGERY

## 2022-04-27 ENCOUNTER — HOSPITAL ENCOUNTER (OUTPATIENT)
Dept: HOSPITAL 74 - JASU-SURG | Age: 47
Discharge: HOME | End: 2022-04-27
Attending: OBSTETRICS & GYNECOLOGY
Payer: COMMERCIAL

## 2022-04-27 VITALS — TEMPERATURE: 97.8 F

## 2022-04-27 VITALS — SYSTOLIC BLOOD PRESSURE: 112 MMHG | DIASTOLIC BLOOD PRESSURE: 64 MMHG | HEART RATE: 97 BPM

## 2022-04-27 VITALS — BODY MASS INDEX: 33 KG/M2

## 2022-04-27 DIAGNOSIS — Q51.3: ICD-10-CM

## 2022-04-27 DIAGNOSIS — N93.8: Primary | ICD-10-CM

## 2022-04-27 PROCEDURE — 0UDB8ZX EXTRACTION OF ENDOMETRIUM, VIA NATURAL OR ARTIFICIAL OPENING ENDOSCOPIC, DIAGNOSTIC: ICD-10-PCS | Performed by: OBSTETRICS & GYNECOLOGY

## 2023-02-03 ENCOUNTER — HOSPITAL ENCOUNTER (INPATIENT)
Dept: HOSPITAL 74 - JER | Age: 48
LOS: 1 days | Discharge: HOME | DRG: 252 | End: 2023-02-04
Attending: INTERNAL MEDICINE | Admitting: HOSPITALIST
Payer: MEDICARE

## 2023-02-03 VITALS — BODY MASS INDEX: 32.8 KG/M2

## 2023-02-03 DIAGNOSIS — E78.5: ICD-10-CM

## 2023-02-03 DIAGNOSIS — T82.590A: Primary | ICD-10-CM

## 2023-02-03 DIAGNOSIS — N18.6: ICD-10-CM

## 2023-02-03 DIAGNOSIS — Z99.2: ICD-10-CM

## 2023-02-03 DIAGNOSIS — Q61.3: ICD-10-CM

## 2023-02-03 DIAGNOSIS — I12.0: ICD-10-CM
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
## 2023-02-03 LAB
ANION GAP SERPL CALC-SCNC: 14 MMOL/L (ref 8–16)
ANISOCYTOSIS BLD QL: (no result)
APTT BLD: 27.6 SECONDS (ref 25.2–36.5)
BASOPHILS # BLD: 0.2 % (ref 0–2)
BUN SERPL-MCNC: 31.5 MG/DL (ref 7–18)
CALCIUM SERPL-MCNC: 8.9 MG/DL (ref 8.5–10.1)
CHLORIDE SERPL-SCNC: 90 MMOL/L (ref 98–107)
CO2 SERPL-SCNC: 27 MMOL/L (ref 21–32)
CREAT SERPL-MCNC: 5.9 MG/DL (ref 0.55–1.3)
DEPRECATED RDW RBC AUTO: 14.2 % (ref 11.6–15.6)
EOSINOPHIL # BLD: 0.2 % (ref 0–4.5)
GLUCOSE SERPL-MCNC: 89 MG/DL (ref 74–106)
HCT VFR BLD CALC: 35.3 % (ref 32.4–45.2)
HGB BLD-MCNC: 12.1 GM/DL (ref 10.7–15.3)
INR BLD: 0.96 (ref 0.83–1.09)
LYMPHOCYTES # BLD: 9 % (ref 8–40)
MACROCYTES BLD QL: 0
MCH RBC QN AUTO: 32.4 PG (ref 25.7–33.7)
MCHC RBC AUTO-ENTMCNC: 34.4 G/DL (ref 32–36)
MCV RBC: 94.4 FL (ref 80–96)
MONOCYTES # BLD AUTO: 5.4 % (ref 3.8–10.2)
NEUTROPHILS # BLD: 85.2 % (ref 42.8–82.8)
PLATELET # BLD AUTO: 211 10^3/UL (ref 134–434)
PMV BLD: 7.7 FL (ref 7.5–11.1)
PT PNL PPP: 11.1 SEC (ref 9.7–13)
RBC # BLD AUTO: 3.74 M/MM3 (ref 3.6–5.2)
SODIUM SERPL-SCNC: 131 MMOL/L (ref 136–145)
WBC # BLD AUTO: 10.1 K/MM3 (ref 4–10)

## 2023-02-04 VITALS — DIASTOLIC BLOOD PRESSURE: 88 MMHG | TEMPERATURE: 98.9 F | SYSTOLIC BLOOD PRESSURE: 152 MMHG

## 2023-02-04 VITALS — HEART RATE: 96 BPM

## 2023-02-04 VITALS — RESPIRATION RATE: 18 BRPM

## 2023-02-04 LAB
ALBUMIN SERPL-MCNC: 2.9 G/DL (ref 3.4–5)
ALP SERPL-CCNC: 71 U/L (ref 45–117)
ALT SERPL-CCNC: 22 U/L (ref 13–61)
ANION GAP SERPL CALC-SCNC: 14 MMOL/L (ref 8–16)
AST SERPL-CCNC: 17 U/L (ref 15–37)
BILIRUB SERPL-MCNC: 0.4 MG/DL (ref 0.2–1)
BUN SERPL-MCNC: 54.5 MG/DL (ref 7–18)
CALCIUM SERPL-MCNC: 8.4 MG/DL (ref 8.5–10.1)
CHLORIDE SERPL-SCNC: 94 MMOL/L (ref 98–107)
CO2 SERPL-SCNC: 26 MMOL/L (ref 21–32)
CREAT SERPL-MCNC: 8.5 MG/DL (ref 0.55–1.3)
DEPRECATED RDW RBC AUTO: 14.6 % (ref 11.6–15.6)
GLUCOSE SERPL-MCNC: 116 MG/DL (ref 74–106)
HCT VFR BLD CALC: 30.2 % (ref 32.4–45.2)
HGB BLD-MCNC: 10.5 GM/DL (ref 10.7–15.3)
MCH RBC QN AUTO: 32.8 PG (ref 25.7–33.7)
MCHC RBC AUTO-ENTMCNC: 34.9 G/DL (ref 32–36)
MCV RBC: 94.2 FL (ref 80–96)
PLATELET # BLD AUTO: 183 10^3/UL (ref 134–434)
PMV BLD: 7.6 FL (ref 7.5–11.1)
PROT SERPL-MCNC: 6.3 G/DL (ref 6.4–8.2)
RBC # BLD AUTO: 3.2 M/MM3 (ref 3.6–5.2)
SODIUM SERPL-SCNC: 134 MMOL/L (ref 136–145)
WBC # BLD AUTO: 7.9 K/MM3 (ref 4–10)

## 2023-02-04 PROCEDURE — 057Y3ZZ DILATION OF UPPER VEIN, PERCUTANEOUS APPROACH: ICD-10-PCS | Performed by: SURGERY

## 2023-02-04 RX ADMIN — CALCIUM ACETATE SCH MG: 667 CAPSULE ORAL at 12:18

## 2023-02-04 RX ADMIN — CALCIUM ACETATE SCH MG: 667 CAPSULE ORAL at 09:01

## 2023-04-04 ENCOUNTER — HOSPITAL ENCOUNTER (OUTPATIENT)
Dept: HOSPITAL 74 - FMAMMOTONE | Age: 48
Discharge: HOME | End: 2023-04-04
Attending: FAMILY MEDICINE
Payer: MEDICARE

## 2023-04-04 DIAGNOSIS — R92.1: ICD-10-CM

## 2023-04-04 DIAGNOSIS — Z53.8: Primary | ICD-10-CM

## 2023-04-04 PROCEDURE — 0HBT3ZX EXCISION OF RIGHT BREAST, PERCUTANEOUS APPROACH, DIAGNOSTIC: ICD-10-PCS

## 2024-08-22 ENCOUNTER — HOSPITAL ENCOUNTER (OUTPATIENT)
Dept: HOSPITAL 74 - FMAMMOTONE | Age: 49
Discharge: HOME | End: 2024-08-22
Payer: MEDICARE

## 2024-08-22 DIAGNOSIS — N64.1: ICD-10-CM

## 2024-08-22 DIAGNOSIS — N64.89: ICD-10-CM

## 2024-08-22 DIAGNOSIS — R92.1: ICD-10-CM

## 2024-08-22 DIAGNOSIS — N60.11: Primary | ICD-10-CM

## 2024-08-22 PROCEDURE — A4648 IMPLANTABLE TISSUE MARKER: HCPCS

## 2024-08-22 PROCEDURE — 0HBT3ZX EXCISION OF RIGHT BREAST, PERCUTANEOUS APPROACH, DIAGNOSTIC: ICD-10-PCS

## 2025-05-05 ENCOUNTER — HOSPITAL ENCOUNTER (EMERGENCY)
Dept: HOSPITAL 74 - JERFT | Age: 50
Discharge: HOME | End: 2025-05-05
Payer: MEDICARE

## 2025-05-05 VITALS
TEMPERATURE: 98.8 F | RESPIRATION RATE: 19 BRPM | BODY MASS INDEX: 33.5 KG/M2 | HEART RATE: 91 BPM | DIASTOLIC BLOOD PRESSURE: 72 MMHG | SYSTOLIC BLOOD PRESSURE: 134 MMHG

## 2025-05-05 DIAGNOSIS — G89.29: ICD-10-CM

## 2025-05-05 DIAGNOSIS — M25.562: Primary | ICD-10-CM

## 2025-05-05 RX ADMIN — LIDOCAINE ONE PATCH: 50 PATCH TOPICAL at 17:58

## 2025-05-05 RX ADMIN — ACETAMINOPHEN ONE MG: 500 TABLET, FILM COATED ORAL at 17:58
